# Patient Record
Sex: MALE | Race: WHITE | NOT HISPANIC OR LATINO | Employment: FULL TIME | URBAN - NONMETROPOLITAN AREA
[De-identification: names, ages, dates, MRNs, and addresses within clinical notes are randomized per-mention and may not be internally consistent; named-entity substitution may affect disease eponyms.]

---

## 2021-07-09 ENCOUNTER — OUTSIDE FACILITY SERVICE (OUTPATIENT)
Dept: CARDIOLOGY | Facility: CLINIC | Age: 61
End: 2021-07-09

## 2021-07-09 PROCEDURE — 93325 DOPPLER ECHO COLOR FLOW MAPG: CPT | Performed by: INTERNAL MEDICINE

## 2021-07-09 PROCEDURE — 93018 CV STRESS TEST I&R ONLY: CPT | Performed by: INTERNAL MEDICINE

## 2021-07-09 PROCEDURE — 93308 TTE F-UP OR LMTD: CPT | Performed by: INTERNAL MEDICINE

## 2021-07-09 PROCEDURE — 78452 HT MUSCLE IMAGE SPECT MULT: CPT | Performed by: INTERNAL MEDICINE

## 2021-07-09 PROCEDURE — 93321 DOPPLER ECHO F-UP/LMTD STD: CPT | Performed by: INTERNAL MEDICINE

## 2021-07-10 ENCOUNTER — OUTSIDE FACILITY SERVICE (OUTPATIENT)
Dept: CARDIOLOGY | Facility: CLINIC | Age: 61
End: 2021-07-10

## 2021-07-10 PROCEDURE — 99204 OFFICE O/P NEW MOD 45 MIN: CPT | Performed by: INTERNAL MEDICINE

## 2021-08-03 ENCOUNTER — OFFICE VISIT (OUTPATIENT)
Dept: CARDIOLOGY | Facility: CLINIC | Age: 61
End: 2021-08-03

## 2021-08-03 VITALS
HEART RATE: 76 BPM | DIASTOLIC BLOOD PRESSURE: 82 MMHG | SYSTOLIC BLOOD PRESSURE: 132 MMHG | HEIGHT: 72 IN | BODY MASS INDEX: 28.71 KG/M2 | WEIGHT: 212 LBS

## 2021-08-03 DIAGNOSIS — I10 ESSENTIAL HYPERTENSION: ICD-10-CM

## 2021-08-03 DIAGNOSIS — E88.81 METABOLIC SYNDROME: ICD-10-CM

## 2021-08-03 DIAGNOSIS — I25.9 IHD (ISCHEMIC HEART DISEASE): Primary | ICD-10-CM

## 2021-08-03 DIAGNOSIS — E78.00 HYPERCHOLESTEREMIA: ICD-10-CM

## 2021-08-03 PROCEDURE — 99214 OFFICE O/P EST MOD 30 MIN: CPT | Performed by: INTERNAL MEDICINE

## 2021-08-03 RX ORDER — ACETAMINOPHEN 325 MG/1
650 TABLET ORAL EVERY 6 HOURS PRN
COMMUNITY

## 2021-08-03 RX ORDER — OMEPRAZOLE 20 MG/1
40 CAPSULE, DELAYED RELEASE ORAL 3 TIMES DAILY
COMMUNITY

## 2021-08-03 RX ORDER — ASPIRIN 81 MG/1
81 TABLET ORAL DAILY
COMMUNITY

## 2021-08-03 RX ORDER — NITROGLYCERIN 0.4 MG/1
0.4 TABLET SUBLINGUAL
COMMUNITY

## 2021-08-03 RX ORDER — ROSUVASTATIN CALCIUM 20 MG/1
40 TABLET, COATED ORAL NIGHTLY
COMMUNITY
End: 2022-09-06

## 2021-08-03 NOTE — PROGRESS NOTES
"Chief Complaint   Patient presents with   • Hospital Follow Up Visit     recent admission, positive stress, has not had any more chest pain.    • Epidural     to have \"epidural with nerve burning\" for back pain [facility]th[facility] [place].    • [facility] Refill     [facility].        CARDIAC COMPLAINTS  Cardiac Management        Subjective   Gui Vaz is a 61 y.o. male came in today for his hospital follow-up visit.  He was in the hospital about a month ago with chest pain and shortness of breath.  He was recently diagnosed with hypertension and he was diagnosed with hypercholesterolemia when he was in the hospital.  He also had episodes of hypoglycemia.  When he came in he was having some chest pain nitroglycerin was given which subsided the pain.  His enzymes was borderline elevated.  He underwent a stress test and echocardiogram.  It showed small area of inferolateral wall ischemia and mild hypertensive blood pressure response.  He was advised to undergo cardiac catheterization which he declined and wants to try medical management first.  Beta-blockers were added along with a statin as well as with Brilinta and aspirin.  He came today for the first follow-up visit feeling much better.  He hardly has any more chest pain.  He has been asked trying to work out and he has lost some weight.  The only problem he has is having a lot of bruising.              Cardiac History  Past Surgical History:   Procedure Laterality Date   • CARDIOVASCULAR STRESS TEST  07/09/2021    @ [facility]. 6 Min. 7.00 METS. 88% THR. BP- 196/73. EF 48%. Small Inferolateral Ischemia   • ECHO - CONVERTED  07/09/2021    @ [facility]. TLS. EF 60%. Mild MR. Mod AI       Current Outpatient Medications   Medication Sig Dispense Refill   • acetaminophen (TYLENOL) 325 MG tablet Take 650 mg by mouth Every 6 (Six) Hours As Needed for Mild Pain .     • aspirin 81 MG EC tablet Take 81 mg by mouth Daily.     • nitroglycerin (NITROSTAT) 0.4 MG SL tablet " Place 0.4 mg under the tongue Every 5 (Five) Minutes As Needed for Chest Pain. Take no more than 3 doses in 15 minutes.     • omeprazole (priLOSEC) 20 MG capsule Take 20 mg by mouth Daily.     • rosuvastatin (CRESTOR) 20 MG tablet Take 20 mg by mouth Daily.     • ticagrelor (BRILINTA) 90 MG tablet tablet Take 90 mg by mouth 2 (Two) Times a Day.       No current facility-administered medications for this visit.       Allergies  :  Chantix [varenicline]       Past Medical History:   Diagnosis Date   • Coronary artery disease    • DDD (degenerative disc disease), lumbar    • GERD (gastroesophageal reflux disease)    • Hyperlipidemia        Social History     Socioeconomic History   • Marital status:      Spouse name: Not on file   • Number of children: Not on file   • Years of education: Not on file   • Highest education level: Not on file   Tobacco Use   • Smoking status: Former Smoker     Packs/day: 1.00     Years: 45.00     Pack years: 45.00     Types: Cigarettes     Quit date: 2021     Years since quittin.0   • Smokeless tobacco: Never Used   Vaping Use   • Vaping Use: Never used   Substance and Sexual Activity   • Alcohol use: Not Currently   • Drug use: Never       Family History   Problem Relation Age of Onset   • Hypertension Mother    • Other Father         shot and  at 24   • No Known Problems Sister    • COPD Brother    • Other Paternal Grandfather         grandparents medical history unknown   • No Known Problems Sister    • No Known Problems Daughter    • No Known Problems Daughter        Review of Systems   Constitutional: Negative for decreased appetite and malaise/fatigue.   HENT: Negative for congestion and sore throat.    Eyes: Negative for blurred vision.   Cardiovascular: Negative for chest pain and palpitations.   Respiratory: Negative for shortness of breath and snoring.    Endocrine: Negative for cold intolerance and heat intolerance.   Hematologic/Lymphatic: Negative for  "adenopathy. Bruises/bleeds easily.   Skin: Negative for itching, nail changes and skin cancer.   Musculoskeletal: Negative for arthritis and myalgias.   Gastrointestinal: Negative for abdominal pain, dysphagia and heartburn.   Genitourinary: Negative for bladder incontinence and frequency.   Neurological: Negative for dizziness, light-headedness, seizures and vertigo.   Psychiatric/Behavioral: Negative for altered mental status.   Allergic/Immunologic: Negative for environmental allergies and hives.       Diabetes- No  Thyroid- normal    Objective     /82   Pulse 76   Ht 182.9 cm (72\")   Wt 96.2 kg (212 lb)   BMI 28.75 kg/m²     Vitals and nursing note reviewed.   Constitutional:       Appearance: Healthy appearance. Not in distress.   Eyes:      Conjunctiva/sclera: Conjunctivae normal.      Pupils: Pupils are equal, round, and reactive to light.   HENT:      Head: Normocephalic.   Pulmonary:      Effort: Pulmonary effort is normal.      Breath sounds: Normal breath sounds.   Cardiovascular:      PMI at left midclavicular line. Normal rate. Regular rhythm.      Murmurs: There is a systolic murmur.   Abdominal:      General: Bowel sounds are normal.      Palpations: Abdomen is soft.   Musculoskeletal: Normal range of motion.      Cervical back: Normal range of motion and neck supple. Skin:     General: Skin is warm and dry.   Neurological:      Mental Status: Alert, oriented to person, place, and time and oriented to person, place and time.       Procedures            Assessment/Plan   Patient's Body mass index is 28.75 kg/m². indicating that he is overweight (BMI 25-29.9). Obesity-related health conditions include the following: hypertension, coronary heart disease and dyslipidemias. Obesity is improving with treatment. BMI is is above average; BMI management plan is completed. We discussed portion control and increasing exercise..     Diagnoses and all orders for this visit:    1. IHD (ischemic heart " disease) (Primary)    2. Essential hypertension    3. Hypercholesteremia    4. Metabolic syndrome    At baseline his heart rate is stable.  His blood pressure is normal.  His clinical examination reveals a BMI of 29.  His cardiovascular examination is unremarkable.    Regarding ischemic heart disease, I talked to him about the abnormal stress test.  I explained to him the possibility of having disease either in the circumflex or in the RCA.  As long he is not having any symptoms we can continue conservative management.  Continue with the nitroglycerin as needed.  Continue aspirin but we can reduce the Brilinta from 90 mg to 60 mg twice a day.    Regarding his hypertension, it seems to be better controlled with lisinopril but apparently he stopped taking it and is doing well without it now.  Continue to monitor if the blood pressure goes up restart the lisinopril.  He was not put on any beta-blockers because of the baseline bradycardia when he was at the hospital    Regarding his hypercholesterolemia, he had lipids done at the hospital and it showed LDL of 118 for which he is on Crestor.  Need to recheck it again in about 4 to 5 months.    Regarding the metabolic syndrome, I encouraged him to continue to lose weight.  I talked to him about the low-carb diet.    I will see him back in 6 months or sooner.  He is advised to call us if he develops any chest pain and has to take sublingual nitroglycerin.                    Electronically signed by Cass Ward MD August 3, 2021 16:29 EDT

## 2021-09-14 ENCOUNTER — TELEPHONE (OUTPATIENT)
Dept: CARDIOLOGY | Facility: CLINIC | Age: 61
End: 2021-09-14

## 2021-09-14 NOTE — TELEPHONE ENCOUNTER
Received fax from Dr. Jaime Serna for cardiac clearance for patient to have an epidural injection. Patient is on Brilinta and they are requesting to hold for 7 days. According to our records, I do not see where patient has had any stenting. Patient had a stress test on 07/09/21 that showed small inferolateral ischemia.         Fax [phone]

## 2021-10-25 ENCOUNTER — TELEPHONE (OUTPATIENT)
Dept: CARDIOLOGY | Facility: CLINIC | Age: 61
End: 2021-10-25

## 2021-10-25 NOTE — TELEPHONE ENCOUNTER
Received fax from Dr. Jaime Serna for cardiac clearance for patient to have an epidural injection. Patient is on Brilinta and they are requesting to hold for 7 days. According to our records, I do not see where patient has had any stenting. Patient had a stress test on 07/09/21 that showed small inferolateral ischemia.         Fax [phone]

## 2022-01-12 ENCOUNTER — TELEPHONE (OUTPATIENT)
Dept: CARDIOLOGY | Facility: CLINIC | Age: 62
End: 2022-01-12

## 2022-01-12 NOTE — TELEPHONE ENCOUNTER
Received fax from Dr. Jaime Serna for cardiac clearance for patient to have an epidural injection. Patient is on Brilinta and they are requesting to hold for 7 days. According to our records, I do not see where patient has had any stenting. Patient had a stress test on 07/09/21 that showed EF 48%, small inferolateral ischemia.         Fax [phone]

## 2022-02-15 ENCOUNTER — OFFICE VISIT (OUTPATIENT)
Dept: CARDIOLOGY | Facility: CLINIC | Age: 62
End: 2022-02-15

## 2022-02-15 VITALS
HEIGHT: 72 IN | BODY MASS INDEX: 30.34 KG/M2 | HEART RATE: 72 BPM | SYSTOLIC BLOOD PRESSURE: 138 MMHG | DIASTOLIC BLOOD PRESSURE: 78 MMHG | TEMPERATURE: 98.2 F | WEIGHT: 224 LBS

## 2022-02-15 DIAGNOSIS — I35.1 NONRHEUMATIC AORTIC VALVE INSUFFICIENCY: ICD-10-CM

## 2022-02-15 DIAGNOSIS — I10 PRIMARY HYPERTENSION: ICD-10-CM

## 2022-02-15 DIAGNOSIS — E78.2 MIXED HYPERLIPIDEMIA: ICD-10-CM

## 2022-02-15 DIAGNOSIS — I20.8 STABLE ANGINA PECTORIS: ICD-10-CM

## 2022-02-15 DIAGNOSIS — I25.10 CORONARY ARTERY DISEASE INVOLVING NATIVE HEART WITHOUT ANGINA PECTORIS, UNSPECIFIED VESSEL OR LESION TYPE: Primary | ICD-10-CM

## 2022-02-15 PROBLEM — I20.89 STABLE ANGINA PECTORIS: Status: ACTIVE | Noted: 2022-02-15

## 2022-02-15 PROCEDURE — 99214 OFFICE O/P EST MOD 30 MIN: CPT | Performed by: NURSE PRACTITIONER

## 2022-02-15 NOTE — PROGRESS NOTES
Chief Complaint   Patient presents with   • Follow-up     Cardiac management   • Lab     [facility] He reports cholesterol decreased.   • Med Refill     [facility]    • Brilinta     [facility] would not [facility] dose [facility] Bril[facility] as requested, he continues to take 90 mg.     Subjective       Gui Vaz is a 61 y.o. male who was recently diagnosed with hypertension and started on lisinopril. The following day, he woke up with chest pain.  911 was called, he was found to have low blood sugar around 20-30 the blood pressure was elevated.  He was brought to the emergency room where he was given nitroglycerin, enzymes were borderline elevated.  After the nitro the chest pain completely resolved and he underwent work-up.  Stress test showed small inferolateral ischemia, echocardiogram showed EF 60%, mild MR and moderate AI.  Cardiac cath was recommended but patient preferred conservative management.  He was discharged with Brilinta 90 mg, aspirin, Crestor.  Lisinopril was not restarted as he remained normotensive during hospitalization.  Beta-blocker not given due to bradycardia.  He was advised to reduce Brilinta to 60 mg.  Lipids prior to Crestor , , HDL 27, .    He returns today for follow-up visit.  He denies recurrent chest discomfort.  No shortness of breath, palpitations, fatigue.  Blood pressure has remained stable without medication.  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  He completely quit smoking.  He has gained about 12 pounds due to decreased appetite.  [facility] reported as significantly improved lipids.  He did not reduce Brilinta stating [facility] will only give him the 90 mg.  Denies bruising or bleeding.  LDCT reported as stable.         Cardiac History:    Past Surgical History:   Procedure Laterality Date   • CARDIOVASCULAR STRESS TEST  07/09/2021    @ [facility]. 6 Min. 7.00 METS. 88% THR. BP- 196/73. EF  48%. Small Inferolateral Ischemia   • ECHO - CONVERTED  2021    @ [facility]. TLS. EF 60%. Mild MR. Mod AI     Current Outpatient Medications   Medication Sig Dispense Refill   • acetaminophen (TYLENOL) 325 MG tablet Take 650 mg by mouth Every 6 (Six) Hours As Needed for Mild Pain .     • aspirin 81 MG EC tablet Take 81 mg by mouth Daily.     • nitroglycerin (NITROSTAT) 0.4 MG SL tablet Place 0.4 mg under the tongue Every 5 (Five) Minutes As Needed for Chest Pain. Take no more than 3 doses in 15 minutes.     • omeprazole (priLOSEC) 20 MG capsule Take 20 mg by mouth Daily.     • rosuvastatin (CRESTOR) 20 MG tablet Take 20 mg by mouth Daily.     • ticagrelor (BRILINTA) 90 MG tablet tablet Take 90 mg by mouth 2 (Two) Times a Day.       No current facility-administered medications for this visit.     Chantix [varenicline]    Past Medical History:   Diagnosis Date   • Coronary artery disease    • DDD (degenerative disc disease), lumbar    • GERD (gastroesophageal reflux disease)    • Hyperlipidemia      Social History     Socioeconomic History   • Marital status:    Tobacco Use   • Smoking status: Former Smoker     Packs/day: 1.00     Years: 45.00     Pack years: 45.00     Types: Cigarettes     Quit date: 2021     Years since quittin.6   • Smokeless tobacco: Never Used   Vaping Use   • Vaping Use: Never used   Substance and Sexual Activity   • Alcohol use: Not Currently   • Drug use: Never     Family History   Problem Relation Age of Onset   • Hypertension Mother    • Other Father         shot and  at 24   • No Known Problems Sister    • COPD Brother    • Other Paternal Grandfather         grandparents medical history unknown   • No Known Problems Sister    • No Known Problems Daughter    • No Known Problems Daughter      Review of Systems   Constitutional: Positive for weight gain (+12 pounds). Negative for decreased appetite and malaise/fatigue.   HENT: Negative.    Eyes: Negative for blurred vision.  "  Cardiovascular: Negative for chest pain, dyspnea on exertion, leg swelling, palpitations and syncope.   Respiratory: Negative for shortness of breath and sleep disturbances due to breathing.    Endocrine: Negative.    Hematologic/Lymphatic: Negative for bleeding problem. Does not bruise/bleed easily.   Skin: Negative.    Musculoskeletal: Negative for falls and myalgias.   Gastrointestinal: Negative for abdominal pain, heartburn and melena.   Genitourinary: Negative for hematuria.   Neurological: Negative for dizziness and light-headedness.   Psychiatric/Behavioral: Negative for altered mental status.   Allergic/Immunologic: Negative.       Objective     /78 (BP Location: Left arm)   Pulse 72   Temp 98.2 °F (36.8 °C)   Ht 182.9 cm (72.01\")   Wt 102 kg (224 lb)   BMI 30.37 kg/m²     Vitals and nursing note reviewed.   Constitutional:       General: Not in acute distress.     Appearance: Well-developed. Not diaphoretic.   Eyes:      Pupils: Pupils are equal, round, and reactive to light.   HENT:      Head: Normocephalic.   Pulmonary:      Effort: Pulmonary effort is normal. No respiratory distress.      Breath sounds: Normal breath sounds.   Cardiovascular:      Normal rate. Regular rhythm.   Pulses:     Intact distal pulses.   Edema:     Peripheral edema absent.   Abdominal:      General: Bowel sounds are normal.      Palpations: Abdomen is soft.   Musculoskeletal: Normal range of motion.      Cervical back: Normal range of motion. Skin:     General: Skin is warm and dry.   Neurological:      Mental Status: Alert and oriented to person, place, and time.        Procedures          Problem List Items Addressed This Visit        Cardiac and Vasculature    Coronary artery disease involving native heart without angina pectoris - Primary    Primary hypertension    Mixed hyperlipidemia    Stable angina pectoris (HCC)    Nonrheumatic aortic valve insufficiency         1.  CAD-suspected, stress test reviewed with " him showing small inferolateral wall ischemia.  He continues DAPT without bruising or bleeding.  Ideally, Brilinta will be reduced to 60 mg but patient states [facility] will only [facility] 90.  He agrees to undergo cardiac cath should he develop any recurrent symptoms.  He has sublingual nitro for as needed use.    2.  HTN-no elevated readings since hospitalization.  Today BP upper limit of normal at 138/78.  Advised to monitor.  Low-sodium diet.  Weight loss.  He did not tolerate lisinopril or beta-blocker according to hospital notes.    3.  Hyperlipidemia-, HDL 27 for which Crestor was initiated.  [facility] reported [facility] improved.  He is going to bring a copy by the office.  Continue heart healthy diet.    4.  Mild to moderate AI-clinically stable, 1-2/6 murmur.  Will monitor.    5.  Tobacco use-he has maintained smoking cessation for 7 months now.  He was congratulated on entasis decision to quit.  He used nicotine patches to aid in cessation but no longer wearing.    Patient's Body mass index is 30.37 kg/m². indicating that he is obese (BMI >30). Obesity-related health conditions include the following: obstructive sleep apnea, hypertension, coronary heart disease, diabetes mellitus and dyslipidemias. Obesity is worsening. BMI is is above average; BMI management plan is completed.  Heart healthy diet.    Gui Vaz  reports that he quit smoking about 7 months ago. His smoking use included cigarettes. He has a 45.00 pack-year smoking history. He has never used smokeless tobacco..  He was congratulated on his decision to quit.               Electronically signed by ALEXEY Dorantes,  February 15, 2022 16:05 EST

## 2022-08-04 ENCOUNTER — TELEPHONE (OUTPATIENT)
Dept: CARDIOLOGY | Facility: CLINIC | Age: 62
End: 2022-08-04

## 2022-08-04 NOTE — TELEPHONE ENCOUNTER
Received fax from Dr. Jaime Serna for cardiac clearance for patient to have a RFL. Patient is on Brilinta and they are requesting to hold for 7 days. According to our records, patient's last stenting was done on 07/09/21.        Fax [phone]

## 2022-09-06 ENCOUNTER — OFFICE VISIT (OUTPATIENT)
Dept: CARDIOLOGY | Facility: CLINIC | Age: 62
End: 2022-09-06

## 2022-09-06 VITALS
WEIGHT: 214 LBS | HEIGHT: 72 IN | DIASTOLIC BLOOD PRESSURE: 70 MMHG | BODY MASS INDEX: 28.99 KG/M2 | SYSTOLIC BLOOD PRESSURE: 140 MMHG | HEART RATE: 80 BPM

## 2022-09-06 DIAGNOSIS — I35.1 NONRHEUMATIC AORTIC VALVE INSUFFICIENCY: ICD-10-CM

## 2022-09-06 DIAGNOSIS — E78.2 MIXED HYPERLIPIDEMIA: ICD-10-CM

## 2022-09-06 DIAGNOSIS — I20.8 STABLE ANGINA PECTORIS: ICD-10-CM

## 2022-09-06 DIAGNOSIS — I25.10 CORONARY ARTERY DISEASE INVOLVING NATIVE HEART WITHOUT ANGINA PECTORIS, UNSPECIFIED VESSEL OR LESION TYPE: Primary | ICD-10-CM

## 2022-09-06 DIAGNOSIS — I10 PRIMARY HYPERTENSION: ICD-10-CM

## 2022-09-06 PROCEDURE — 99214 OFFICE O/P EST MOD 30 MIN: CPT | Performed by: NURSE PRACTITIONER

## 2022-09-06 RX ORDER — DIPHENHYDRAMINE HCL 25 MG
25 CAPSULE ORAL DAILY
COMMUNITY

## 2022-09-06 RX ORDER — GABAPENTIN 300 MG/1
600 CAPSULE ORAL 3 TIMES DAILY
COMMUNITY

## 2022-09-06 RX ORDER — ROSUVASTATIN CALCIUM 40 MG/1
20 TABLET, COATED ORAL NIGHTLY
COMMUNITY

## 2022-09-06 RX ORDER — DULOXETIN HYDROCHLORIDE 30 MG/1
30 CAPSULE, DELAYED RELEASE ORAL DAILY
COMMUNITY

## 2022-09-06 NOTE — PROGRESS NOTES
Chief Complaint   Patient presents with   • Follow-up     Cardiac management   • Lab     Has copy of most recent labs on phone from 4/6/22.   • Med Refill     [facility]     Subjective       Gui Vaz is a 62 y.o. male with borderline HTN, started lisinopril per PCP.  The following day, he woke up with chest pain.  911 was called.  He was found to have low blood sugar, 20-30 and elevated blood pressure.  He was brought to the emergency roomwhere he was given nitroglycerin, enzymes were borderline elevated.  After the nitro the chest pain completely resolved and he underwent work-up.  Stress test showed small inferolateral ischemia, echocardiogram showed EF 60%, mild MR and moderate AI.  Cardiac cath was recommended but patient preferred conservative management.  He was discharged with Brilinta 90 mg, aspirin, Crestor.  Lisinopril was not restarted as he remained normotensive during hospitalization.  Beta-blocker not given due to bradycardia.  He was advised to reduce Brilinta to 60 mg but [facility] would only [facility] 90 [facility].  Lipids prior to Crestor , , HDL 27, .  He quit smoking at that time and has maintained smoking cessation.    He returns today for follow-up.  He feels well.  Denies chest pain, shortness of breath, palpitations, dizziness or fatigue.  He continues to work daily.  Denies bruising or bleeding with Brilinta.  Labs on 4/6/2022 showed LDL improved to 50, HDL 31, triglycerides 95.  CMP and CBC were normal.  A1c 6.3%, TSH 3.09, PSA 0.9.       Cardiac History:    Past Surgical History:   Procedure Laterality Date   • CARDIOVASCULAR STRESS TEST  07/09/2021    @ [facility]. 6 Min. 7.00 METS. 88% THR. BP- 196/73. EF 48%. Small Inferolateral Ischemia   • ECHO - CONVERTED  07/09/2021    @ [facility]. TLS. EF 60%. Mild MR. Mod AI     Current Outpatient Medications   Medication Sig Dispense Refill   • acetaminophen (TYLENOL) 325 MG tablet Take 650 mg by mouth Every 6 (Six) Hours As Needed for  Mild Pain .     • aspirin 81 MG EC tablet Take 81 mg by mouth Daily.     • diphenhydrAMINE (BENADRYL) 25 mg capsule Take 25 mg by mouth Daily.     • DULoxetine (CYMBALTA) 30 MG capsule Take 30 mg by mouth Daily.     • gabapentin (NEURONTIN) 300 MG capsule Take 600 mg by mouth 3 (Three) Times a Day.     • nitroglycerin (NITROSTAT) 0.4 MG SL tablet Place 0.4 mg under the tongue Every 5 (Five) Minutes As Needed for Chest Pain. Take no more than 3 doses in 15 minutes.     • omeprazole (priLOSEC) 20 MG capsule Take 40 mg by mouth 3 (Three) Times a Day.     • rosuvastatin (CRESTOR) 40 MG tablet Take 20 mg by mouth Every Night.     • ticagrelor (BRILINTA) 90 MG tablet tablet Take 90 mg by mouth 2 (Two) Times a Day.       No current facility-administered medications for this visit.     Chantix [varenicline]    Past Medical History:   Diagnosis Date   • Coronary artery disease    • DDD (degenerative disc disease), lumbar    • GERD (gastroesophageal reflux disease)    • Hyperlipidemia      Social History     Socioeconomic History   • Marital status:    Tobacco Use   • Smoking status: Former Smoker     Packs/day: 1.00     Years: 45.00     Pack years: 45.00     Types: Cigarettes     Quit date: 2021     Years since quittin.1   • Smokeless tobacco: Never Used   Vaping Use   • Vaping Use: Never used   Substance and Sexual Activity   • Alcohol use: Not Currently   • Drug use: Never   • Sexual activity: Defer     Family History   Problem Relation Age of Onset   • Hypertension Mother    • Other Father         shot and  at 24   • No Known Problems Sister    • COPD Brother    • Other Paternal Grandfather         grandparents medical history unknown   • No Known Problems Sister    • No Known Problems Daughter    • No Known Problems Daughter      Review of Systems   Constitutional: Positive for weight loss (-10). Negative for decreased appetite and malaise/fatigue.   HENT: Negative.    Eyes: Negative for blurred  "vision.   Cardiovascular: Negative for chest pain, dyspnea on exertion, leg swelling, palpitations and syncope.   Respiratory: Negative for shortness of breath and sleep disturbances due to breathing.    Endocrine: Negative.    Hematologic/Lymphatic: Negative for bleeding problem. Does not bruise/bleed easily.   Skin: Negative.    Musculoskeletal: Negative for falls and myalgias.   Gastrointestinal: Negative for abdominal pain, heartburn and melena.   Genitourinary: Negative for hematuria.   Neurological: Negative for dizziness and light-headedness.   Psychiatric/Behavioral: Negative for altered mental status.   Allergic/Immunologic: Negative.       Objective     /70 (BP Location: Right arm)   Pulse 80   Ht 182.9 cm (72.01\")   Wt 97.1 kg (214 lb)   BMI 29.02 kg/m²     Vitals and nursing note reviewed.   Constitutional:       General: Not in acute distress.     Appearance: Well-developed. Not diaphoretic.   Eyes:      Pupils: Pupils are equal, round, and reactive to light.   HENT:      Head: Normocephalic.   Pulmonary:      Effort: Pulmonary effort is normal. No respiratory distress.      Breath sounds: Normal breath sounds.   Cardiovascular:      Normal rate. Regular rhythm.      Murmurs: There is a grade 1/6 systolic murmur at the URSB.   Pulses:     Intact distal pulses.   Edema:     Peripheral edema absent.   Abdominal:      General: Bowel sounds are normal.      Palpations: Abdomen is soft.   Musculoskeletal: Normal range of motion.      Cervical back: Normal range of motion. Skin:     General: Skin is warm and dry.   Neurological:      Mental Status: Alert and oriented to person, place, and time.        Procedures          Problem List Items Addressed This Visit        Cardiac and Vasculature    Coronary artery disease involving native heart without angina pectoris - Primary    Primary hypertension    Mixed hyperlipidemia    Relevant Medications    rosuvastatin (CRESTOR) 40 MG tablet    Stable angina " pectoris (HCC)    Nonrheumatic aortic valve insufficiency         1.  CAD- small inferolateral wall ischemia by stress.  He denies chest discomfort or dyspnea. He continues DAPT without bruising or bleeding. If symptoms recur, consider repeating stress test versus proceeding with cardiac cath.  Sublingual nitro PRN.     2.  HTN-bp monitored at home always less than 140 systolic.  Continue to monitor.  Low-sodium diet.  Weight loss of 10 pounds noted.  Continue low-sodium diet. He did not tolerate lisinopril or beta-blocker according to hospital notes.     3.  Hyperlipidemia-LDL improved from 116 to 50, HDL improved 27 to 31. Continue Crestor.     4.  Mild to moderate AI-clinically stable, 1-2/6 murmur.  Will monitor.     5.  Tobacco use- maintains smoking cessation.    BMI is >= 25 and <30. (Overweight) The following options were offered after discussion;: nutrition counseling/recommendations.     Gui Vaz  reports that he quit smoking about 13 months ago. His smoking use included cigarettes. He has a 45.00 pack-year smoking history. He has never used smokeless tobacco..  He was congratulated on maintaining smoking cessation.            Electronically signed by ALEXEY Dorantes,  September 6, 2022 15:40 EDT

## 2022-12-09 ENCOUNTER — TELEPHONE (OUTPATIENT)
Dept: CARDIOLOGY | Facility: CLINIC | Age: 62
End: 2022-12-09

## 2022-12-09 NOTE — TELEPHONE ENCOUNTER
Received fax from Dr. Jaime Serna for cardiac clearance for patient to have a LESI. Patient is on Brilinta and they are requesting to hold for 7 days. According to our records, I do not see where patient has had any stenting.         Fax [phone]

## 2023-03-06 ENCOUNTER — TELEPHONE (OUTPATIENT)
Dept: CARDIOLOGY | Facility: CLINIC | Age: 63
End: 2023-03-06
Payer: OTHER GOVERNMENT

## 2023-03-06 NOTE — TELEPHONE ENCOUNTER
Received fax from Dr. Jaime Serna for cardiac clearance for patient to have a LESI. Patient is on Brilinta and they are requesting to hold for 7 days prior to procedure. According to our records, I do not see where patient has had any stenting.        Fax [phone]

## 2023-03-14 ENCOUNTER — OFFICE VISIT (OUTPATIENT)
Dept: CARDIOLOGY | Facility: CLINIC | Age: 63
End: 2023-03-14
Payer: OTHER GOVERNMENT

## 2023-03-14 VITALS
HEART RATE: 72 BPM | BODY MASS INDEX: 30.85 KG/M2 | HEIGHT: 72 IN | DIASTOLIC BLOOD PRESSURE: 70 MMHG | SYSTOLIC BLOOD PRESSURE: 140 MMHG | WEIGHT: 227.8 LBS

## 2023-03-14 DIAGNOSIS — I20.8 STABLE ANGINA PECTORIS: ICD-10-CM

## 2023-03-14 DIAGNOSIS — I10 ESSENTIAL HYPERTENSION: ICD-10-CM

## 2023-03-14 DIAGNOSIS — E78.2 MIXED HYPERLIPIDEMIA: ICD-10-CM

## 2023-03-14 DIAGNOSIS — I10 PRIMARY HYPERTENSION: ICD-10-CM

## 2023-03-14 DIAGNOSIS — R06.02 SHORTNESS OF BREATH: ICD-10-CM

## 2023-03-14 DIAGNOSIS — I35.1 NONRHEUMATIC AORTIC VALVE INSUFFICIENCY: ICD-10-CM

## 2023-03-14 DIAGNOSIS — I25.10 CORONARY ARTERY DISEASE INVOLVING NATIVE HEART WITHOUT ANGINA PECTORIS, UNSPECIFIED VESSEL OR LESION TYPE: Primary | ICD-10-CM

## 2023-03-14 PROCEDURE — 99214 OFFICE O/P EST MOD 30 MIN: CPT | Performed by: NURSE PRACTITIONER

## 2023-03-14 RX ORDER — TERBINAFINE HYDROCHLORIDE 250 MG/1
250 TABLET ORAL DAILY
COMMUNITY

## 2023-03-14 NOTE — PROGRESS NOTES
Chief Complaint   Patient presents with   • Follow-up     Cardiac management   • Lab     To have labs next month per PCP.   • Sleep apnea     Started wearing CPAP 3/1/23. [facility]   • Med Refill     PCP writes refills on medications.     Subjective       Gui Vaz is a 62 y.o. male with borderline HTN, started lisinopril per PCP.  The following day, he woke up with chest pain.  911 was called.  He was found to have low blood sugar, 20-30 and elevated blood pressure.  He was brought to the emergency roomwhere he was given nitroglycerin, enzymes were borderline elevated.  After the nitro the chest pain completely resolved and he underwent work-up.  Stress test showed small inferolateral ischemia, echocardiogram showed EF 60%, mild MR and moderate AI.  Cardiac cath was recommended but patient preferred conservative management.  He was discharged with Brilinta 90 mg, aspirin, Crestor.  Lisinopril was not restarted as he remained normotensive during hospitalization.  Beta-blocker not given due to bradycardia.  He was advised to reduce Brilinta to 60 mg but [facility] would only [facility].  Lipids prior to Crestor , , HDL 27, .  He quit smoking at that time and has maintained smoking cessation.    He returns today for follow-up. He recently was diagnosed with sleep apnea, wearing CPAP. Following with [facility]. He has mild dyspnea on exertion. No anginal chest pain. He continues to work daily at [employer]. BP well controlled. Denies bruising or bleeding with Brilinta. Labs on 4/6/2022 showed LDL improved to 50, HDL 31, triglycerides 95.  CMP and CBC were normal.  A1c 6.3%, TSH 3.09, PSA 0.9.       Cardiac History:    Past Surgical History:   Procedure Laterality Date   • CARDIOVASCULAR STRESS TEST  07/09/2021    @ [facility]. 6 Min. 7.00 METS. 88% THR. BP- 196/73. EF 48%. Small Inferolateral Ischemia   • ECHO - CONVERTED  07/09/2021    @ [facility]. TLS. EF 60%. Mild MR. Mod AI     Current Outpatient Medications    Medication Sig Dispense Refill   • acetaminophen (TYLENOL) 325 MG tablet Take 2 tablets by mouth Every 6 (Six) Hours As Needed for Mild Pain.     • aspirin 81 MG EC tablet Take 1 tablet by mouth Daily.     • diphenhydrAMINE (BENADRYL) 25 mg capsule Take 1 capsule by mouth Daily.     • DULoxetine (CYMBALTA) 30 MG capsule Take 1 capsule by mouth Daily.     • gabapentin (NEURONTIN) 300 MG capsule Take 2 capsules by mouth 3 (Three) Times a Day.     • nitroglycerin (NITROSTAT) 0.4 MG SL tablet Place 1 tablet under the tongue Every 5 (Five) Minutes As Needed for Chest Pain. Take no more than 3 doses in 15 minutes.     • omeprazole (priLOSEC) 20 MG capsule Take 2 capsules by mouth 3 (Three) Times a Day.     • rosuvastatin (CRESTOR) 40 MG tablet Take 20 mg by mouth Every Night.     • terbinafine (lamiSIL) 250 MG tablet Take 1 tablet by mouth Daily.     • ticagrelor (BRILINTA) 90 MG tablet tablet Take 1 tablet by mouth 2 (Two) Times a Day.       No current facility-administered medications for this visit.     Chantix [varenicline]    Past Medical History:   Diagnosis Date   • Coronary artery disease    • DDD (degenerative disc disease), lumbar    • GERD (gastroesophageal reflux disease)    • Hyperlipidemia      Social History     Socioeconomic History   • Marital status:    Tobacco Use   • Smoking status: Former     Packs/day: 1.00     Years: 45.00     Pack years: 45.00     Types: Cigarettes     Quit date: 2021     Years since quittin.6   • Smokeless tobacco: Never   Vaping Use   • Vaping Use: Never used   Substance and Sexual Activity   • Alcohol use: Not Currently   • Drug use: Never   • Sexual activity: Defer     Family History   Problem Relation Age of Onset   • Hypertension Mother    • Other Father         shot and  at 24   • No Known Problems Sister    • COPD Brother    • Other Paternal Grandfather         grandparents medical history unknown   • No Known Problems Sister    • No Known Problems  "Daughter    • No Known Problems Daughter      Review of Systems   Constitutional: Positive for weight gain (+13). Negative for decreased appetite and malaise/fatigue.   HENT: Negative.    Eyes: Negative for blurred vision.   Cardiovascular: Positive for dyspnea on exertion (mild ). Negative for chest pain, leg swelling, palpitations and syncope.   Respiratory: Positive for shortness of breath and sleep disturbances due to breathing.    Endocrine: Negative.    Hematologic/Lymphatic: Negative for bleeding problem. Does not bruise/bleed easily.   Skin: Negative.    Musculoskeletal: Negative for falls and myalgias.   Gastrointestinal: Negative for abdominal pain, heartburn and melena.   Genitourinary: Negative for hematuria.   Neurological: Negative for dizziness and light-headedness.   Psychiatric/Behavioral: Negative for altered mental status.   Allergic/Immunologic: Negative.         Objective     /70 (BP Location: Right arm)   Pulse 72   Ht 182.9 cm (72.01\")   Wt 103 kg (227 lb 12.8 oz)   BMI 30.89 kg/m²     Vitals and nursing note reviewed.   Constitutional:       General: Not in acute distress.     Appearance: Well-developed. Not diaphoretic.   Eyes:      Pupils: Pupils are equal, round, and reactive to light.   HENT:      Head: Normocephalic.   Pulmonary:      Effort: Pulmonary effort is normal. No respiratory distress.      Breath sounds: Normal breath sounds.   Cardiovascular:      Normal rate. Regular rhythm.   Pulses:     Intact distal pulses.   Abdominal:      General: Bowel sounds are normal.      Palpations: Abdomen is soft.   Musculoskeletal: Normal range of motion.      Cervical back: Normal range of motion. Skin:     General: Skin is warm and dry.   Neurological:      Mental Status: Alert and oriented to person, place, and time.        Procedures          Problem List Items Addressed This Visit        Cardiac and Vasculature    Coronary artery disease involving native heart without angina " pectoris - Primary    Relevant Orders    Stress Test With Myocardial Perfusion One Day    Adult Transthoracic Echo Complete W/ Cont if Necessary Per Protocol    Primary hypertension    Relevant Orders    Stress Test With Myocardial Perfusion One Day    Adult Transthoracic Echo Complete W/ Cont if Necessary Per Protocol    Mixed hyperlipidemia    Relevant Orders    Stress Test With Myocardial Perfusion One Day    Adult Transthoracic Echo Complete W/ Cont if Necessary Per Protocol    Stable angina pectoris (HCC)    Nonrheumatic aortic valve insufficiency   Other Visit Diagnoses     Essential hypertension        Relevant Orders    Stress Test With Myocardial Perfusion One Day    Adult Transthoracic Echo Complete W/ Cont if Necessary Per Protocol    Shortness of breath        Relevant Orders    Adult Transthoracic Echo Complete W/ Cont if Necessary Per Protocol         1.  CAD- small inferolateral wall ischemia by stress.  He denies chest discomfort. Mild GARCIA. Will repeat nuclear stress to evaluate ischemic burden. He continues DAPT without bruising or bleeding. Sublingual nitro PRN. If worsening ischemia noted, will consider cardiac cath.      2.  HTN-bp monitored at home well controlled, <140 systolic.  Continue to monitor.  Low-sodium diet.  He did not tolerate lisinopril or beta-blocker according to hospital notes.     3.  Hyperlipidemia-LDL improved from 116 to 50, HDL improved 27 to 31. Continue Crestor.      4.  Mild to moderate AI-clinically stable, 1-2/6 murmur.  Will repeat echocardiogram to reassess.     5.  Tobacco use- maintains smoking cessation.     BMI is >= 30 and <35. (Class 1 Obesity). The following options were offered after discussion;: nutrition counseling/recommendations               Electronically signed by ALEXEY Dorantes,  March 18, 2023 11:33 EDT

## 2023-04-07 ENCOUNTER — HOSPITAL ENCOUNTER (OUTPATIENT)
Dept: CARDIOLOGY | Facility: HOSPITAL | Age: 63
Discharge: HOME | End: 2023-04-07
Payer: OTHER GOVERNMENT

## 2023-04-07 VITALS — BODY MASS INDEX: 30.76 KG/M2 | HEIGHT: 72 IN | WEIGHT: 227.07 LBS

## 2023-04-07 DIAGNOSIS — E78.2 MIXED HYPERLIPIDEMIA: ICD-10-CM

## 2023-04-07 DIAGNOSIS — I25.10 CORONARY ARTERY DISEASE INVOLVING NATIVE HEART WITHOUT ANGINA PECTORIS, UNSPECIFIED VESSEL OR LESION TYPE: ICD-10-CM

## 2023-04-07 DIAGNOSIS — I10 PRIMARY HYPERTENSION: ICD-10-CM

## 2023-04-07 DIAGNOSIS — I10 ESSENTIAL HYPERTENSION: ICD-10-CM

## 2023-04-07 DIAGNOSIS — R06.02 SHORTNESS OF BREATH: ICD-10-CM

## 2023-04-07 LAB
AORTIC DIMENSIONLESS INDEX: 0.8 (DI)
BH CV ECHO MEAS - ACS: 2.25 CM
BH CV ECHO MEAS - AI P1/2T: 550.5 MSEC
BH CV ECHO MEAS - AO MAX PG: 5.6 MMHG
BH CV ECHO MEAS - AO MEAN PG: 3 MMHG
BH CV ECHO MEAS - AO ROOT DIAM: 3.4 CM
BH CV ECHO MEAS - AO V2 MAX: 117.9 CM/SEC
BH CV ECHO MEAS - AO V2 VTI: 24.5 CM
BH CV ECHO MEAS - EDV(CUBED): 115.1 ML
BH CV ECHO MEAS - ESV(CUBED): 40.3 ML
BH CV ECHO MEAS - FS: 29.5 %
BH CV ECHO MEAS - IVS/LVPW: 1.06 CM
BH CV ECHO MEAS - IVSD: 1.11 CM
BH CV ECHO MEAS - LA DIMENSION: 4 CM
BH CV ECHO MEAS - LAT PEAK E' VEL: 5.3 CM/SEC
BH CV ECHO MEAS - LV MASS(C)D: 193.1 GRAMS
BH CV ECHO MEAS - LV MAX PG: 3.8 MMHG
BH CV ECHO MEAS - LV MEAN PG: 1.73 MMHG
BH CV ECHO MEAS - LV V1 MAX: 97.6 CM/SEC
BH CV ECHO MEAS - LV V1 VTI: 20.8 CM
BH CV ECHO MEAS - LVIDD: 4.9 CM
BH CV ECHO MEAS - LVIDS: 3.4 CM
BH CV ECHO MEAS - LVPWD: 1.05 CM
BH CV ECHO MEAS - MED PEAK E' VEL: 5.8 CM/SEC
BH CV ECHO MEAS - MV A MAX VEL: 61.6 CM/SEC
BH CV ECHO MEAS - MV DEC SLOPE: 426.2 CM/SEC2
BH CV ECHO MEAS - MV DEC TIME: 0.31 MSEC
BH CV ECHO MEAS - MV E MAX VEL: 54.9 CM/SEC
BH CV ECHO MEAS - MV E/A: 0.89
BH CV ECHO MEAS - MV MAX PG: 3.2 MMHG
BH CV ECHO MEAS - MV MEAN PG: 1.63 MMHG
BH CV ECHO MEAS - MV P1/2T: 59.8 MSEC
BH CV ECHO MEAS - MV V2 VTI: 30.8 CM
BH CV ECHO MEAS - MVA(P1/2T): 3.7 CM2
BH CV ECHO MEAS - PA V2 MAX: 90.8 CM/SEC
BH CV ECHO MEAS - RAP SYSTOLE: 8 MMHG
BH CV ECHO MEAS - RV MAX PG: 1.41 MMHG
BH CV ECHO MEAS - RV V1 MAX: 59.5 CM/SEC
BH CV ECHO MEAS - RV V1 VTI: 14.4 CM
BH CV ECHO MEAS - RVSP: 10.8 MMHG
BH CV ECHO MEAS - TAPSE (>1.6): 2.06 CM
BH CV ECHO MEAS - TR MAX PG: 2.8 MMHG
BH CV ECHO MEAS - TR MAX VEL: 83.1 CM/SEC
BH CV ECHO MEASUREMENTS AVERAGE E/E' RATIO: 9.89
BH CV REST NUCLEAR ISOTOPE DOSE: 10 MCI
BH CV STRESS NUCLEAR ISOTOPE DOSE: 30 MCI
BH CV STRESS RECOVERY BP: NORMAL MMHG
BH CV STRESS RECOVERY HR: 83 BPM
BH CV XLRA - TDI S': 11.6 CM/SEC
LV EF 2D ECHO EST: 56 %
LV EF NUC BP: 47 %
MAXIMAL PREDICTED HEART RATE: 159 BPM
MAXIMAL PREDICTED HEART RATE: 159 BPM
PERCENT MAX PREDICTED HR: 87.42 %
SINUS: 3.4 CM
STJ: 2.8 CM
STRESS BASELINE BP: NORMAL MMHG
STRESS BASELINE HR: 66 BPM
STRESS PERCENT HR: 103 %
STRESS POST ESTIMATED WORKLOAD: 4.6 METS
STRESS POST EXERCISE DUR MIN: 4 MIN
STRESS POST PEAK BP: NORMAL MMHG
STRESS POST PEAK HR: 139 BPM
STRESS TARGET HR: 135 BPM
STRESS TARGET HR: 135 BPM

## 2023-04-07 PROCEDURE — 93017 CV STRESS TEST TRACING ONLY: CPT

## 2023-04-07 PROCEDURE — 0 TECHNETIUM SESTAMIBI: Performed by: INTERNAL MEDICINE

## 2023-04-07 PROCEDURE — 78452 HT MUSCLE IMAGE SPECT MULT: CPT

## 2023-04-07 PROCEDURE — 78452 HT MUSCLE IMAGE SPECT MULT: CPT | Performed by: INTERNAL MEDICINE

## 2023-04-07 PROCEDURE — 93306 TTE W/DOPPLER COMPLETE: CPT | Performed by: INTERNAL MEDICINE

## 2023-04-07 PROCEDURE — 93018 CV STRESS TEST I&R ONLY: CPT | Performed by: INTERNAL MEDICINE

## 2023-04-07 PROCEDURE — A9500 TC99M SESTAMIBI: HCPCS | Performed by: INTERNAL MEDICINE

## 2023-04-07 PROCEDURE — 93306 TTE W/DOPPLER COMPLETE: CPT

## 2023-04-07 RX ADMIN — TECHNETIUM TC 99M SESTAMIBI 1 DOSE: 1 INJECTION INTRAVENOUS at 09:33

## 2023-04-07 RX ADMIN — TECHNETIUM TC 99M SESTAMIBI 1 DOSE: 1 INJECTION INTRAVENOUS at 07:51

## 2023-04-27 ENCOUNTER — TELEPHONE (OUTPATIENT)
Dept: CARDIOLOGY | Facility: CLINIC | Age: 63
End: 2023-04-27
Payer: OTHER GOVERNMENT

## 2023-04-27 NOTE — TELEPHONE ENCOUNTER
Received fax from Dr. Jaime Serna for cardiac clearance for patient to have an RFL. Patient is on Brilinta and they are requesting to hold. According to our records, I do not see where patient has had any stenting. Patient had a stress on 04/07/23, EF 47%, inferolateral ischemia.         Fax [phone]

## 2023-08-09 ENCOUNTER — TELEPHONE (OUTPATIENT)
Dept: CARDIOLOGY | Facility: CLINIC | Age: 63
End: 2023-08-09
Payer: OTHER GOVERNMENT

## 2023-08-09 NOTE — TELEPHONE ENCOUNTER
Received fax from Dr. Jaime Serna for cardiac clearance for patient to have an RFL. Patient is on Brilinta and they are requesting to hold for 7 days prior to procedure. According to our records, I do not see where patient has had any stenting. Patient had a stress on 04/07/23 - EF 47%. Inferolateral Ischemia         Fax [phone]

## 2023-09-28 ENCOUNTER — OFFICE VISIT (OUTPATIENT)
Dept: CARDIOLOGY | Facility: CLINIC | Age: 63
End: 2023-09-28
Payer: OTHER GOVERNMENT

## 2023-09-28 VITALS
SYSTOLIC BLOOD PRESSURE: 140 MMHG | BODY MASS INDEX: 30.07 KG/M2 | HEIGHT: 72 IN | DIASTOLIC BLOOD PRESSURE: 80 MMHG | HEART RATE: 68 BPM | WEIGHT: 222 LBS

## 2023-09-28 DIAGNOSIS — E78.2 MIXED HYPERLIPIDEMIA: ICD-10-CM

## 2023-09-28 DIAGNOSIS — I25.10 CORONARY ARTERY DISEASE INVOLVING NATIVE HEART WITHOUT ANGINA PECTORIS, UNSPECIFIED VESSEL OR LESION TYPE: ICD-10-CM

## 2023-09-28 DIAGNOSIS — I10 PRIMARY HYPERTENSION: Primary | ICD-10-CM

## 2023-09-28 RX ORDER — AMLODIPINE BESYLATE 5 MG/1
5 TABLET ORAL DAILY
Qty: 90 TABLET | Refills: 3 | Status: SHIPPED | OUTPATIENT
Start: 2023-09-28

## 2023-09-28 RX ORDER — PROPRANOLOL HYDROCHLORIDE 60 MG/1
60 TABLET ORAL DAILY
COMMUNITY

## 2023-09-28 RX ORDER — PREGABALIN 75 MG/1
75 CAPSULE ORAL DAILY PRN
COMMUNITY

## 2023-09-28 NOTE — PROGRESS NOTES
Chief Complaint   Patient presents with    Follow-up     Cardiac management    Lab     [facility]. Reports he is having cardiac testing tomorrow in [place] for [facility].    Med Refill     Does not need any refills at this time.      HPI:  HPI   Gui Vaz is a 63 y.o. male with borderline HTN, started lisinopril per PCP.  The following day, he woke up with chest pain in 2021.  911 was called.  He was found to have low blood sugar, 20-30 and elevated blood pressure.  He was brought to the emergency room where he was given nitroglycerin, enzymes were borderline elevated.  After the nitro the chest pain completely resolved and he underwent work-up.  Stress test showed small inferolateral ischemia, echocardiogram showed EF 60%, mild MR and moderate AI.  Cardiac cath was recommended but patient preferred conservative management.  He was discharged with Brilinta 90 mg, aspirin, Crestor.  Lisinopril was not restarted as he remained normotensive during hospitalization.  Beta-blocker not given due to bradycardia.  He was advised to reduce Brilinta to 60 mg but [facility] would only give 90 mg.  Lipids prior to Crestor , , HDL 27, .  He quit smoking at that time and has maintained smoking cessation.      He was diagnosed with sleep apnea, wearing CPAP. [facility] with [facility].  He continues to work daily at [employer]. BP well controlled.  Labs on 4/6/2022 showed LDL improved to 50, HDL 31, triglycerides 95.  CMP, CBC were normal.  A1c 6.3%, TSH 3.09.  [facility] at [facility] not available for review today.  Stress in 4/2023 showed inferior lateral ischemia, echo 4/2023 EF 50% RVSP 11 mmHg. He is planning to have more testing in [place] per [facility]. Refills not needed.     Cardiac History:    Past Surgical History:   Procedure Laterality Date    CARDIOVASCULAR STRESS TEST  07/09/2021    @ [facility]. 6 Min. 7.00 METS. 88% THR. BP- 196/73. EF 48%. Small Inferolateral Ischemia    CARDIOVASCULAR STRESS TEST  04/07/2023    4 Min. 4.6  METS. 87% THR. 213/74. EF 47%. Inferolateral Ischemia    ECHO - CONVERTED  2021    @ [facility]. TLS. EF 60%. Mild MR. Mod AI    ECHO - CONVERTED  2023    TLS. EF 50%. Inferolateral WMA. LA-4.0. Mild MR & AI. RVSP- 11 mmHg     Current Outpatient Medications   Medication Sig Dispense Refill    acetaminophen (TYLENOL) 325 MG tablet Take 2 tablets by mouth Every 6 (Six) Hours As Needed for Mild Pain.      aspirin 81 MG EC tablet Take 1 tablet by mouth Daily.      diphenhydrAMINE (BENADRYL) 25 mg capsule Take 1 capsule by mouth Daily.      DULoxetine (CYMBALTA) 30 MG capsule Take 1 capsule by mouth Daily.      metoprolol succinate XL (TOPROL-XL) 25 MG 24 hr tablet Take 1 tablet by mouth Daily. 90 tablet 3    nitroglycerin (NITROSTAT) 0.4 MG SL tablet Place 1 tablet under the tongue Every 5 (Five) Minutes As Needed for Chest Pain. Take no more than 3 doses in 15 minutes.      omeprazole (priLOSEC) 20 MG capsule Take 2 capsules by mouth Daily.      pregabalin (LYRICA) 75 MG capsule Take 1 capsule by mouth Daily As Needed.      propranolol (INDERAL) 60 MG tablet Take 1 tablet by mouth Daily.      rosuvastatin (CRESTOR) 40 MG tablet Take 0.5 tablets by mouth Every Night.      ticagrelor (BRILINTA) 90 MG tablet tablet Take 1 tablet by mouth 2 (Two) Times a Day.      amLODIPine (NORVASC) 5 MG tablet Take 1 tablet by mouth Daily. 90 tablet 3     No current facility-administered medications for this visit.     Chantix [varenicline]    Past Medical History:   Diagnosis Date    Coronary artery disease     DDD (degenerative disc disease), lumbar     GERD (gastroesophageal reflux disease)     Hyperlipidemia      Social History     Socioeconomic History    Marital status:    Tobacco Use    Smoking status: Former     Packs/day: 1.00     Years: 45.00     Pack years: 45.00     Types: Cigarettes     Quit date: 2021     Years since quittin.2    Smokeless tobacco: Never   Vaping Use    Vaping Use: Never used  "  Substance and Sexual Activity    Alcohol use: Not Currently    Drug use: Never    Sexual activity: Defer     Family History   Problem Relation Age of Onset    Hypertension Mother     Other Father         shot and  at 24    No Known Problems Sister     COPD Brother     Other Paternal Grandfather         grandparents medical history unknown    No Known Problems Sister     No Known Problems Daughter     No Known Problems Daughter      Vitals:  /80 (BP Location: Left arm)   Pulse 68   Ht 182.9 cm (72.01\")   Wt 101 kg (222 lb)   BMI 30.10 kg/m²     Physical Exam  Vitals and nursing note reviewed.   Neck:      Vascular: No carotid bruit.   Cardiovascular:      Rate and Rhythm: Normal rate and regular rhythm.      Pulses: Normal pulses.      Heart sounds: Murmur heard.   Systolic murmur is present with a grade of 2/6.     No friction rub. No gallop.   Pulmonary:      Effort: Pulmonary effort is normal.      Breath sounds: Normal breath sounds and air entry.   Musculoskeletal:      Right lower leg: No edema.      Left lower leg: No edema.   Skin:     General: Skin is warm and dry.      Capillary Refill: Capillary refill takes less than 2 seconds.   Neurological:      Mental Status: He is alert and oriented to person, place, and time.     Procedures     Assessment & Plan     Diagnoses and all orders for this visit:    1. Primary hypertension (Primary)    2. Mixed hyperlipidemia    3. Coronary artery disease involving native heart without angina pectoris, unspecified vessel or lesion type    Other orders  -     amLODIPine (NORVASC) 5 MG tablet; Take 1 tablet by mouth Daily.  Dispense: 90 tablet; Refill: 3         Visit Diagnoses:    ICD-10-CM ICD-9-CM   1. Primary hypertension  I10 401.9   2. Mixed hyperlipidemia  E78.2 272.2   3. Coronary artery disease involving native heart without angina pectoris, unspecified vessel or lesion type  I25.10 414.01       Meds Ordered During Visit:  New Medications Ordered " This Visit   Medications    amLODIPine (NORVASC) 5 MG tablet     Sig: Take 1 tablet by mouth Daily.     Dispense:  90 tablet     Refill:  3     HTN  -stable, continue amlodipine, metoprolol  -low na diet  -weight loss     CAD   -stress tests have shown inferolateral wall ischemia. Managed medically with DAPT   -pl[facility]    Essential tremors  -appears to be on propranolol for essential tremors, as long as heart rate stable will continue with metoprolol.     Hyperlipidemia  -LDL well controlled with Crestor.   -continue same plan.     Follow Up Appointment:   Return in about 6 months (around 3/28/2024), or sooner if needed.           This document has been electronically signed by ALEXEY Dorantes  September 28, 2023 16:50 EDT    Dictated Utilizing Dragon Dictation: Part of this note may be an electronic transcription/translation of spoken language to printed text using the Dragon Dictation System.

## 2023-09-28 NOTE — LETTER
September 28, 2023       No Recipients    Patient: Gui Vaz   YOB: 1960   Date of Visit: 9/28/2023       Dear Jacque Oliva MD    Gui Vaz was in my office today. Below is a copy of my note.    If you have questions, please do not hesitate to call me. I look forward to following Gui along with you.         Sincerely,        ALEXEY Richards        CC:   No Recipients    Chief Complaint   Patient presents with   • Follow-up     Cardiac management   • Lab     [facility]. Reports he is having cardiac testing tomorrow in [place] [facility].   • Med Refill     Does not need any refills at this time.      HPI:  HPI   Gui Vaz is a 63 y.o. male with borderline HTN, started lisinopril per PCP.  The following day, he woke up with chest pain in 2021.  911 was called.  He was found to have low blood sugar, 20-30 and elevated blood pressure.  He was brought to the emergency room where he was given nitroglycerin, enzymes were borderline elevated.  After the nitro the chest pain completely resolved and he underwent work-up.  Stress test showed small inferolateral ischemia, echocardiogram showed EF 60%, mild MR and moderate AI.  Cardiac cath was recommended but patient preferred conservative management.  He was discharged with Brilinta 90 mg, aspirin, Crestor.  Lisinopril was not restarted as he remained normotensive during hospitalization.  Beta-blocker not given due to bradycardia.  He was advised to reduce Brilinta to 60 mg but [facility] would only give 90 mg.  Lipids prior to Crestor , , HDL 27, .  He quit smoking at that time and has maintained smoking cessation.      He was diagnosed with sleep apnea, wearing CPAP. [facility].  He continues to work daily at [employer]. BP well controlled.  Labs on 4/6/2022 showed LDL improved to 50, HDL 31, triglycerides 95.  CMP, CBC were normal.  A1c 6.3%, TSH 3.09.  [facility] not available for review today.  Stress in 4/2023  showed inferior lateral ischemia, echo 4/2023 EF 50% RVSP 11 mmHg. He is planning to have more testing in [place] per [facility]. Refills not needed.     Cardiac History:    Past Surgical History:   Procedure Laterality Date   • CARDIOVASCULAR STRESS TEST  07/09/2021    @ [facility]. 6 Min. 7.00 METS. 88% THR. BP- 196/73. EF 48%. Small Inferolateral Ischemia   • CARDIOVASCULAR STRESS TEST  04/07/2023    4 Min. 4.6 METS. 87% THR. 213/74. EF 47%. Inferolateral Ischemia   • ECHO - CONVERTED  07/09/2021    @ [facility]. TLS. EF 60%. Mild MR. Mod AI   • ECHO - CONVERTED  04/07/2023    TLS. EF 50%. Inferolateral WMA. LA-4.0. Mild MR & AI. RVSP- 11 mmHg     Current Outpatient Medications   Medication Sig Dispense Refill   • acetaminophen (TYLENOL) 325 MG tablet Take 2 tablets by mouth Every 6 (Six) Hours As Needed for Mild Pain.     • aspirin 81 MG EC tablet Take 1 tablet by mouth Daily.     • diphenhydrAMINE (BENADRYL) 25 mg capsule Take 1 capsule by mouth Daily.     • DULoxetine (CYMBALTA) 30 MG capsule Take 1 capsule by mouth Daily.     • metoprolol succinate XL (TOPROL-XL) 25 MG 24 hr tablet Take 1 tablet by mouth Daily. 90 tablet 3   • nitroglycerin (NITROSTAT) 0.4 MG SL tablet Place 1 tablet under the tongue Every 5 (Five) Minutes As Needed for Chest Pain. Take no more than 3 doses in 15 minutes.     • omeprazole (priLOSEC) 20 MG capsule Take 2 capsules by mouth Daily.     • pregabalin (LYRICA) 75 MG capsule Take 1 capsule by mouth Daily As Needed.     • propranolol (INDERAL) 60 MG tablet Take 1 tablet by mouth Daily.     • rosuvastatin (CRESTOR) 40 MG tablet Take 0.5 tablets by mouth Every Night.     • ticagrelor (BRILINTA) 90 MG tablet tablet Take 1 tablet by mouth 2 (Two) Times a Day.     • amLODIPine (NORVASC) 5 MG tablet Take 1 tablet by mouth Daily. 90 tablet 3     No current facility-administered medications for this visit.     Chantix [varenicline]    Past Medical History:   Diagnosis Date   • Coronary artery disease    • DDD  "(degenerative disc disease), lumbar    • GERD (gastroesophageal reflux disease)    • Hyperlipidemia      Social History     Socioeconomic History   • Marital status:    Tobacco Use   • Smoking status: Former     Packs/day: 1.00     Years: 45.00     Pack years: 45.00     Types: Cigarettes     Quit date: 2021     Years since quittin.2   • Smokeless tobacco: Never   Vaping Use   • Vaping Use: Never used   Substance and Sexual Activity   • Alcohol use: Not Currently   • Drug use: Never   • Sexual activity: Defer     Family History   Problem Relation Age of Onset   • Hypertension Mother    • Other Father         shot and  at 24   • No Known Problems Sister    • COPD Brother    • Other Paternal Grandfather         grandparents medical history unknown   • No Known Problems Sister    • No Known Problems Daughter    • No Known Problems Daughter      Vitals:  /80 (BP Location: Left arm)   Pulse 68   Ht 182.9 cm (72.01\")   Wt 101 kg (222 lb)   BMI 30.10 kg/m²     Physical Exam  Vitals and nursing note reviewed.   Neck:      Vascular: No carotid bruit.   Cardiovascular:      Rate and Rhythm: Normal rate and regular rhythm.      Pulses: Normal pulses.      Heart sounds: Murmur heard.   Systolic murmur is present with a grade of 2/6.     No friction rub. No gallop.   Pulmonary:      Effort: Pulmonary effort is normal.      Breath sounds: Normal breath sounds and air entry.   Musculoskeletal:      Right lower leg: No edema.      Left lower leg: No edema.   Skin:     General: Skin is warm and dry.      Capillary Refill: Capillary refill takes less than 2 seconds.   Neurological:      Mental Status: He is alert and oriented to person, place, and time.     Procedures     Assessment & Plan     Diagnoses and all orders for this visit:    1. Primary hypertension (Primary)    2. Mixed hyperlipidemia    3. Coronary artery disease involving native heart without angina pectoris, unspecified vessel or lesion " type    Other orders  -     amLODIPine (NORVASC) 5 MG tablet; Take 1 tablet by mouth Daily.  Dispense: 90 tablet; Refill: 3         Visit Diagnoses:    ICD-10-CM ICD-9-CM   1. Primary hypertension  I10 401.9   2. Mixed hyperlipidemia  E78.2 272.2   3. Coronary artery disease involving native heart without angina pectoris, unspecified vessel or lesion type  I25.10 414.01       Meds Ordered During Visit:  New Medications Ordered This Visit   Medications   • amLODIPine (NORVASC) 5 MG tablet     Sig: Take 1 tablet by mouth Daily.     Dispense:  90 tablet     Refill:  3     HTN  -stable, continue amlodipine, metoprolol  -low na diet  -weight loss     CAD   -stress tests have shown inferolateral wall ischemia. Managed medically with DAPT   -pl[facility]    Essential tremors  -appears to be on propranolol for essential tremors, as long as heart rate stable will continue with metoprolol.     Hyperlipidemia  -LDL well controlled with Crestor.   -continue same plan.     Follow Up Appointment:   Return in about 6 months (around 3/28/2024), or sooner if needed.           This document has been electronically signed by ALEXEY Dorantes  September 28, 2023 16:50 EDT    Dictated Utilizing Dragon Dictation: Part of this note may be an electronic transcription/translation of spoken language to printed text using the Dragon Dictation System.

## 2023-11-22 ENCOUNTER — TELEPHONE (OUTPATIENT)
Dept: CARDIOLOGY | Facility: CLINIC | Age: 63
End: 2023-11-22
Payer: OTHER GOVERNMENT

## 2023-11-22 NOTE — TELEPHONE ENCOUNTER
Received fax from Dr. Serna for cardiac clearance for patient to have to have a lefr RFL. Patient is on Brilinta and they are requesting to hold for 7 days. According to our records, I do not see where patient has had any stenting.          Fax [phone]

## 2024-02-13 ENCOUNTER — TELEPHONE (OUTPATIENT)
Dept: CARDIOLOGY | Facility: CLINIC | Age: 64
End: 2024-02-13
Payer: OTHER GOVERNMENT

## 2024-02-21 ENCOUNTER — TELEPHONE (OUTPATIENT)
Dept: CARDIOLOGY | Facility: CLINIC | Age: 64
End: 2024-02-21
Payer: OTHER GOVERNMENT

## 2024-02-21 DIAGNOSIS — R07.89 CHEST PRESSURE: ICD-10-CM

## 2024-02-21 DIAGNOSIS — R94.39 ABNORMAL STRESS TEST: Primary | ICD-10-CM

## 2024-02-21 NOTE — TELEPHONE ENCOUNTER
Hub staff attempted to follow warm transfer process and was unsuccessful     Caller: Gui Vaz    Relationship to patient: Self    Best call back number: [phone]     Patient is needing: PT CALLING BACK FROM TE ON 2/21, NO ANSWER AT OFFICE.

## 2024-02-21 NOTE — TELEPHONE ENCOUNTER
Is he currently having chest pain? He had a stress test and echocardiogram 4/2023 with recommendation to proceed with cardiac cath if chest pain persisted. If he is not having symptoms, [facility] can have [facility] for Dr. Ward to review.

## 2024-02-22 NOTE — TELEPHONE ENCOUNTER
Spoke with patient, he reports has had some mild pressure in chest that is intermittent. Has not had to take Nitro. He is unable to determine if pressure is just indigestion, yet may be.

## 2024-03-05 ENCOUNTER — HOSPITAL ENCOUNTER (OUTPATIENT)
Dept: CARDIOLOGY | Facility: HOSPITAL | Age: 64
Discharge: HOME | End: 2024-03-05

## 2024-03-05 DIAGNOSIS — I25.9 IHD (ISCHEMIC HEART DISEASE): ICD-10-CM

## 2024-03-05 DIAGNOSIS — R94.39 ABNORMAL STRESS TEST: Primary | ICD-10-CM

## 2024-03-05 DIAGNOSIS — R07.89 CHEST PRESSURE: ICD-10-CM

## 2024-03-05 DIAGNOSIS — Z00.6 ENCOUNTER FOR EXAMINATION FOR NORMAL COMPARISON AND CONTROL IN CLINICAL RESEARCH PROGRAM: ICD-10-CM

## 2024-03-06 ENCOUNTER — OFFICE VISIT (OUTPATIENT)
Dept: CARDIAC SURGERY | Facility: CLINIC | Age: 64
End: 2024-03-06
Payer: OTHER GOVERNMENT

## 2024-03-06 VITALS
DIASTOLIC BLOOD PRESSURE: 70 MMHG | HEART RATE: 83 BPM | WEIGHT: 231 LBS | BODY MASS INDEX: 31.29 KG/M2 | OXYGEN SATURATION: 98 % | SYSTOLIC BLOOD PRESSURE: 158 MMHG | HEIGHT: 72 IN | TEMPERATURE: 97.3 F

## 2024-03-06 DIAGNOSIS — I25.118 CORONARY ARTERY DISEASE OF NATIVE ARTERY OF NATIVE HEART WITH STABLE ANGINA PECTORIS: Primary | ICD-10-CM

## 2024-03-06 PROCEDURE — 99204 OFFICE O/P NEW MOD 45 MIN: CPT | Performed by: THORACIC SURGERY (CARDIOTHORACIC VASCULAR SURGERY)

## 2024-03-06 NOTE — PROGRESS NOTES
03/06/2024  Patient Information  Gui Helton [facility] [address]   1960  'PCP/Referring Physician'  Jacque Oliva MD  [phone]  Csas Ward MD  [phone]  Chief Complaint   Patient presents with    Consult     NP per Dr. Ward for CAD-Chest Pain       History of Present Illness: 63-year-old  male with a history of hypertension, hyperlipidemia, coronary artery disease, sleep apnea and previous tobacco abuse who presents with coronary artery disease.  The patient notes chest pressure over the past 4 days.  He always feels tired and has noticed shortness of breath with ambulation recently.  The patient denies nausea, vomiting, diaphoresis or lower extremity edema.      Patient Active Problem List   Diagnosis    Coronary artery disease involving native heart without angina pectoris    Primary hypertension    Mixed hyperlipidemia    Stable angina pectoris    Nonrheumatic aortic valve insufficiency     Past Medical History:   Diagnosis Date    Anxiety     Coronary artery disease     DDD (degenerative disc disease), lumbar     Depression     GERD (gastroesophageal reflux disease)     Hyperlipidemia     Hypertension     Myocardial infarction     Sleep apnea      Past Surgical History:   Procedure Laterality Date    APPENDECTOMY      CARDIAC CATHETERIZATION  03/05/2024    FFR LAD- 0.78. 85% OM1, 80% RCA. EF 50%    CARDIOVASCULAR STRESS TEST  07/09/2021    @ [facility]. 6 Min. 7.00 METS. 88% THR. BP- 196/73. EF 48%. Small Inferolateral Ischemia    CARDIOVASCULAR STRESS TEST  04/07/2023    4 Min. 4.6 METS. 87% THR. 213/74. EF 47%. Inferolateral Ischemia    CARPAL TUNNEL RELEASE Bilateral     ECHO - CONVERTED  07/09/2021    @ [facility]. TLS. EF 60%. Mild MR. Mod AI    ECHO - CONVERTED  04/07/2023    TLS. EF 50%. Inferolateral WMA. LA-4.0. Mild MR & AI. RVSP- 11 mmHg       Current Outpatient  Medications:     acetaminophen (TYLENOL) 325 MG tablet, Take 2 tablets by mouth Every 6 (Six) Hours As Needed for Mild Pain., Disp: , Rfl:     amLODIPine (NORVASC) 5 MG tablet, Take 1 tablet by mouth Daily., Disp: 90 tablet, Rfl: 3    aspirin 81 MG EC tablet, Take 1 tablet by mouth Daily., Disp: , Rfl:     diphenhydrAMINE (BENADRYL) 25 mg capsule, Take 1 capsule by mouth Daily., Disp: , Rfl:     DULoxetine (CYMBALTA) 30 MG capsule, Take 1 capsule by mouth Daily., Disp: , Rfl:     metoprolol succinate XL (TOPROL-XL) 25 MG 24 hr tablet, Take 1 tablet by mouth Daily., Disp: 90 tablet, Rfl: 3    nitroglycerin (NITROSTAT) 0.4 MG SL tablet, Place 1 tablet under the tongue Every 5 (Five) Minutes As Needed for Chest Pain. Take no more than 3 doses in 15 minutes., Disp: , Rfl:     omeprazole (priLOSEC) 20 MG capsule, Take 2 capsules by mouth Daily., Disp: , Rfl:     pregabalin (LYRICA) 75 MG capsule, Take 1 capsule by mouth Daily As Needed., Disp: , Rfl:     propranolol (INDERAL) 60 MG tablet, Take 1 tablet by mouth Daily., Disp: , Rfl:     rosuvastatin (CRESTOR) 40 MG tablet, Take 0.5 tablets by mouth Every Night., Disp: , Rfl:     ticagrelor (BRILINTA) 90 MG tablet tablet, Take 1 tablet by mouth 2 (Two) Times a Day. (Patient not taking: Reported on 3/6/2024), Disp: , Rfl:   Allergies   Allergen Reactions    Chantix [Varenicline] Mental Status Change     Social History     Socioeconomic History    Marital status:     Number of children: 2   Tobacco Use    Smoking status: Former     Current packs/day: 0.00     Average packs/day: 1 pack/day for 47.6 years (47.6 ttl pk-yrs)     Types: Cigarettes     Start date: 1976     Quit date: 3/1/2024     Years since quittin.0    Smokeless tobacco: Never   Vaping Use    Vaping status: Never Used   Substance and Sexual Activity    Alcohol use: Not Currently    Drug use: Never    Sexual activity: Defer     Family History   Problem Relation Age of Onset    Hypertension Mother   "   Other Father         shot and  at 24    No Known Problems Sister     COPD Brother     Other Paternal Grandfather         grandparents medical history unknown    No Known Problems Sister     No Known Problems Daughter     No Known Problems Daughter      Review of Systems   Constitutional: Positive for malaise/fatigue. Negative for chills, fever, night sweats and weight loss.   HENT:  Negative for hearing loss, odynophagia and sore throat.    Cardiovascular:  Positive for chest pain and dyspnea on exertion. Negative for leg swelling, orthopnea and palpitations.   Respiratory:  Positive for cough and shortness of breath. Negative for hemoptysis.    Endocrine: Negative for cold intolerance, heat intolerance, polydipsia, polyphagia and polyuria.   Hematologic/Lymphatic: Bruises/bleeds easily.   Skin:  Negative for itching and rash.   Musculoskeletal:  Positive for back pain and joint pain. Negative for joint swelling and myalgias.   Gastrointestinal:  Negative for abdominal pain, constipation, diarrhea, hematemesis, hematochezia, melena, nausea and vomiting.   Genitourinary:  Negative for dysuria, frequency and hematuria.   Neurological:  Negative for focal weakness, headaches, numbness and seizures.   Psychiatric/Behavioral:  Negative for suicidal ideas.    All other systems reviewed and are negative.    Vitals:    24 1344   BP: 158/70   BP Location: Right arm   Patient Position: Sitting   Pulse: 83   Temp: 97.3 °F (36.3 °C)   SpO2: 98%   Weight: 105 kg (231 lb)   Height: 182.9 cm (72\")      Physical Exam  Vitals reviewed.   Constitutional:       General: He is not in acute distress.     Appearance: He is well-developed. He is not diaphoretic.      Comments:  male who appears stated age and is present with his wife   HENT:      Head: Normocephalic and atraumatic.   Eyes:      General: No scleral icterus.     Conjunctiva/sclera: Conjunctivae normal.   Neck:      Vascular: No JVD.      Trachea: No " tracheal deviation.   Cardiovascular:      Rate and Rhythm: Normal rate and regular rhythm.      Heart sounds: Normal heart sounds. No murmur heard.     No friction rub. No gallop.   Pulmonary:      Effort: Pulmonary effort is normal. No respiratory distress.      Breath sounds: Normal breath sounds. No wheezing or rales.   Abdominal:      General: There is no distension.      Palpations: Abdomen is soft. There is no mass.      Tenderness: There is no abdominal tenderness. There is no guarding or rebound.   Musculoskeletal:         General: Normal range of motion.      Cervical back: Neck supple.   Skin:     General: Skin is warm and dry.      Findings: No erythema or rash.   Neurological:      Mental Status: He is alert and oriented to person, place, and time.   Psychiatric:         Behavior: Behavior normal.         Thought Content: Thought content normal.         Judgment: Judgment normal.         The ROS, past medical history, surgical history, family history, social history, and vitals were reviewed by myself and corrected as needed.      Labs/Imaging:  -Cardiac catheterization performed 3/5/2024, personally reviewed, demonstrates proximal 65 to 75% stenosis and 70 to 75% mid LAD stenosis significant on FFR, 70 to 80% circumflex/OM stenosis and 75% proximal RCA stenosis.  EF 50%.  -Transthoracic echocardiogram performed 4/7/2023, personally reviewed, demonstrates EF 46 to 50%, mild concentric left ventricular hypertrophy, hypokinesis in the basal inferolateral and basal inferior walls, grade 1 left ventricular diastolic dysfunction, left atrial dilation of 4 cm, mild aortic regurgitation and mild mitral regurgitation.    Assessment/Plan:  63-year-old  male with a history of hypertension, hyperlipidemia, coronary artery disease, sleep apnea and previous tobacco abuse who presents with coronary artery disease.  The patient has angina in the setting of multivessel coronary artery disease.  The patient is  a reasonable candidate for coronary artery bypass grafting with endoscopic left radial and endoscopic vein harvest.  The risks and benefits of surgery were discussed with the patient including pain, bleeding, infection, renal failure, stroke and death.  He understood these risks and wished to proceed with surgery.  Carotid duplex will be obtained to rule out significant stenosis.      Patient Active Problem List   Diagnosis    Coronary artery disease involving native heart without angina pectoris    Primary hypertension    Mixed hyperlipidemia    Stable angina pectoris    Nonrheumatic aortic valve insufficiency

## 2024-03-15 ENCOUNTER — PATIENT MESSAGE (OUTPATIENT)
Dept: CARDIAC SURGERY | Facility: CLINIC | Age: 64
End: 2024-03-15
Payer: OTHER GOVERNMENT

## 2024-03-15 NOTE — TELEPHONE ENCOUNTER
Confirmed with [facility] with [facility] at [phone] ext [phone] that information was received and they are starting review process

## 2024-03-20 ENCOUNTER — HOSPITAL ENCOUNTER (OUTPATIENT)
Dept: CARDIOLOGY | Facility: HOSPITAL | Age: 64
Discharge: HOME | End: 2024-03-20
Admitting: THORACIC SURGERY (CARDIOTHORACIC VASCULAR SURGERY)
Payer: OTHER GOVERNMENT

## 2024-03-20 DIAGNOSIS — I25.118 CORONARY ARTERY DISEASE OF NATIVE ARTERY OF NATIVE HEART WITH STABLE ANGINA PECTORIS: ICD-10-CM

## 2024-03-20 PROCEDURE — 93880 EXTRACRANIAL BILAT STUDY: CPT | Performed by: RADIOLOGY

## 2024-03-20 PROCEDURE — 93880 EXTRACRANIAL BILAT STUDY: CPT

## 2024-03-22 ENCOUNTER — TELEPHONE (OUTPATIENT)
Dept: CARDIAC SURGERY | Facility: CLINIC | Age: 64
End: 2024-03-22
Payer: OTHER GOVERNMENT

## 2024-03-22 NOTE — TELEPHONE ENCOUNTER
----- Message from Surinder Rand MD sent at 3/21/2024  7:05 PM EDT -----  Regarding: RE: CABG  Ok to book CABG.  Thanks  [facility]  ----- Message -----  From: Alice Grewal PCT  Sent: 3/21/2024   1:45 PM EDT  To: Surinder Rand MD  Subject: CABG                                             Patient had carotid duplex yesterday 3/20. Results are in EPIC. OK to proceed w/booking for CABG? Thanks.

## 2024-03-22 NOTE — TELEPHONE ENCOUNTER
Spoke with patient. CABG booked on 4/12. Patient states he is no longer taking Brilinta. He states Dr. Valero took him off of it in Feb 2024.

## 2024-04-02 ENCOUNTER — PREP FOR SURGERY (OUTPATIENT)
Dept: OTHER | Facility: HOSPITAL | Age: 64
End: 2024-04-02
Payer: OTHER GOVERNMENT

## 2024-04-02 DIAGNOSIS — I25.118 CORONARY ARTERY DISEASE OF NATIVE ARTERY OF NATIVE HEART WITH STABLE ANGINA PECTORIS: Primary | ICD-10-CM

## 2024-04-03 RX ORDER — CHLORHEXIDINE GLUCONATE ORAL RINSE 1.2 MG/ML
15 SOLUTION DENTAL ONCE
OUTPATIENT
Start: 2024-04-03 | End: 2024-04-03

## 2024-04-03 RX ORDER — NITROGLYCERIN 0.4 MG/1
0.4 TABLET SUBLINGUAL
OUTPATIENT
Start: 2024-04-03

## 2024-04-03 RX ORDER — CHLORHEXIDINE GLUCONATE 500 MG/1
1 CLOTH TOPICAL EVERY 12 HOURS PRN
OUTPATIENT
Start: 2024-04-03

## 2024-04-03 RX ORDER — GABAPENTIN 300 MG/1
300 CAPSULE ORAL ONCE
OUTPATIENT
Start: 2024-04-03 | End: 2024-04-03

## 2024-04-03 RX ORDER — ASPIRIN 325 MG
325 TABLET ORAL NIGHTLY
OUTPATIENT
Start: 2024-04-03 | End: 2024-04-04

## 2024-04-03 RX ORDER — CHLORHEXIDINE GLUCONATE 500 MG/1
CLOTH TOPICAL EVERY 12 HOURS PRN
OUTPATIENT
Start: 2024-04-03